# Patient Record
(demographics unavailable — no encounter records)

---

## 2017-10-04 NOTE — REPMRS
Patient History

The patient states she has not had a clinical breast exam in over

a year.

Patient is nulliparous.

Family history of prostate cancer in father at age 50 or over.

Taking hormonal contraceptives for 4 years 2 months.

 

Digital Woman Screen Mammo: October 4, 2017 - Exam #: 

[id number]

Bilateral CC and MLO view(s) were taken.

 

Technologist: Samara Hope, Technologist

Prior study comparison: September 28, 2016, digital woman screen 

mammo performed at [facility].  September 24, 2015,

digital woman screen mammo performed at [facility] 

[facility].  September 8, 2014, digital woman screen mammo performed 

at [facility].

 

FINDINGS: The breast tissue is heterogeneously dense.  This may 

lower the sensitivity of mammography.  There is a moderate amount

of heterogeneously dense fibroglandular tissue which is fairly 

symmetric. There is no interval development of dominant mass, 

architectural distortion, or clustered microcalcification typical

of malignancy. There has been no change in the appearance of the

mammogram from the prior studies.

 

ASSESSMENT: BI-RADS/ACR category 1 mammogram. Negative.

 

Recommendation

Routine screening mammogram of both breasts in 1 year (for women 

over age 40).

 

This mammogram was interpreted with the aid of an FDA-approved 

computer-aided dectection system.

 

Electronically Signed By: David Dorsey MD 10/04/17 6089